# Patient Record
Sex: FEMALE | Race: WHITE | Employment: UNEMPLOYED | URBAN - METROPOLITAN AREA
[De-identification: names, ages, dates, MRNs, and addresses within clinical notes are randomized per-mention and may not be internally consistent; named-entity substitution may affect disease eponyms.]

---

## 2017-02-01 PROBLEM — O09.299 HISTORY OF MACROSOMIA IN INFANT IN PRIOR PREGNANCY, CURRENTLY PREGNANT: Status: ACTIVE | Noted: 2017-02-01

## 2017-02-03 PROBLEM — O09.299 HISTORY OF MACROSOMIA IN INFANT IN PRIOR PREGNANCY, CURRENTLY PREGNANT: Status: RESOLVED | Noted: 2017-02-01 | Resolved: 2017-02-03

## 2017-02-03 PROBLEM — D64.9 ANEMIA: Status: ACTIVE | Noted: 2017-02-03

## 2019-04-22 ENCOUNTER — OFFICE VISIT (OUTPATIENT)
Dept: INTERNAL MEDICINE CLINIC | Age: 39
End: 2019-04-22

## 2019-04-22 VITALS
BODY MASS INDEX: 27.46 KG/M2 | SYSTOLIC BLOOD PRESSURE: 119 MMHG | RESPIRATION RATE: 17 BRPM | HEIGHT: 63 IN | OXYGEN SATURATION: 98 % | HEART RATE: 83 BPM | WEIGHT: 155 LBS | DIASTOLIC BLOOD PRESSURE: 76 MMHG | TEMPERATURE: 98.1 F

## 2019-04-22 DIAGNOSIS — F32.A DEPRESSION, UNSPECIFIED DEPRESSION TYPE: ICD-10-CM

## 2019-04-22 DIAGNOSIS — Z00.00 ROUTINE GENERAL MEDICAL EXAMINATION AT A HEALTH CARE FACILITY: Primary | ICD-10-CM

## 2019-04-22 DIAGNOSIS — Z13.1 DIABETES MELLITUS SCREENING: ICD-10-CM

## 2019-04-22 PROBLEM — Z97.5 IUD (INTRAUTERINE DEVICE) IN PLACE: Status: ACTIVE | Noted: 2019-03-01

## 2019-04-22 RX ORDER — BUPROPION HYDROCHLORIDE 150 MG/1
TABLET ORAL
COMMUNITY
End: 2021-03-16 | Stop reason: ALTCHOICE

## 2019-04-22 RX ORDER — TRETINOIN 0.25 MG/G
CREAM TOPICAL
COMMUNITY
End: 2021-03-16 | Stop reason: ALTCHOICE

## 2019-04-22 NOTE — PROGRESS NOTES
Chief Complaint Patient presents with [facility] Establish Care  Depression HPI:  
 
Chris Schmitt is a 41 y.o.  female with history of depression  who presents for complete physical exam. She said that the wellbutrin is causing her blurry vision with increase in dosage and also increase watery eyes. She said lexapro made her have weight gain and had flat affect. She denies any suicidal or homicidal ideations. She is taking wellbutrin 450mg one po daily. She denies any chest pain, shortness of breath, abdominal pain, headaches or dizziness. She wants to continue to take the wellbutrin for now. Past Medical History:  
Diagnosis Date  Anemia 2/3/2017  Depression   
 adjustment disorder  IUD (intrauterine device) in place 03/01/2019  Thrombosis 2000   
 in R calf from sports injury Past Surgical History:  
Procedure Laterality Date [address] I&D of hematoma from sports injury MEDICATION ALLERGIES/INTOLERANCES:  
No Known Allergies CURRENT MEDICATIONS: 
 
Current Outpatient Medications Medication Sig  
 buPROPion XL (WELLBUTRIN XL) 150 mg tablet bupropion HCl  mg 24 hr tablet, extended release 
 take 1 tablet by mouth once daily TAKE TOTAL  MGS  tretinoin (RETIN-A) 0.025 % topical cream tretinoin 0.025 % topical cream  
 
No current facility-administered medications for this visit. Pt taking retin-A to \"smooth out her face. \" Health Maintenance Topic Date Due  Influenza Age 5 to Adult  08/01/2019  PAP AKA CERVICAL CYTOLOGY  02/21/2022  DTaP/Tdap/Td series (2 - Tdap) 12/15/2026  Pneumococcal 0-64 years  Aged Out FAMILY HISTORY:  
Family History Problem Relation Age of Onset  No Known Problems Mother  Other Father   
     basal cell carcinoma  No Known Problems Sister  No Known Problems Brother  No Known Problems Sister  No Known Problems Sister  No Known Problems Sister SOCIAL HISTORY:  
She  reports that she has never smoked. She has never used smokeless tobacco.  She  reports that she drinks alcohol. Pap smear: 2019 ROS:  
General: negative for - chills, fatigue, fever, weight loss or weight gain, night sweats HEENT: negative for - no sore throat, nasal congestion, vision problems or ear problems Resp: negative for - cough, shortness of breath or wheezing CV: negative for - chest pain, palpitations, orthopnea or PND, 
GI: negative for - abdominal pain, change in bowel habits, constipation, diarrhea, blood or black tarry stools, or nausea/vomiting : negative for - dysuria, hematuria, incontinence, pelvic pain or vulvar/vaginal symptoms Heme: negative for -excessive bleeding or bruising Endo: negative for - hot flashes, polydipsia/polyuria or hot or cold intolerance MSK: negative for - joint pain, joint swelling or muscle pain Neuro: negative for - numbness, tingling, headache or dizziness Derm: negative for - dry skin, hair changes, rash or skin lesion changes Psych: negative for - anxiety,irritability or mood swings or insomnia, positive for depression. Stable on wellbutrin. OBJECTIVE: 
PHYSICAL EXAM: Vitals:  
Vitals:  
 04/22/19 1035 BP: 119/76 Pulse: 83 Resp: 17 Temp: 98.1 °F (36.7 °C) TempSrc: Oral  
SpO2: 98% Weight: 155 lb (70.3 kg) Height: 5' 3\" (1.6 m) Generally: Pleasant female in no acute distress HEENT exam: Head: atraumatic Eyes: Pupils equally round and reactive to light, Fundoscopic exam is                       normal 
             Ears: bilaterally: Normal tympanic membrane, no erythema or exudate,                         normal light Reflex Nares: moist mucosa, no erythema Mouth: clear, no erythema or exudate Neck: supple, no lymphadenopathy, negative thyromegaly, negative                                   carotid bruits bilaterally Cardiac exam: regular, rate, and rhythm. No murmurs, gallops, or rubs. Normal S1 and S2. 
 
Pulmonary exam: Clear to ausculation bilaterally Abdominal exam: Positive bowel sounds in all four quadrants, soft, nondistended, nontender. No hepatosplenomegaly. Extremities: 2+ dorsalis pedis bilaterally. No pedal edema bilaterally. Musculoskeletal exam: 5 out of 5 strength in upper and lower extremities bilaterally. Good range of motion in upper and lower extremities. 2 out of 2 reflexes in upper and lower extremities bilaterally. Neurological exam: Cranial nerves II-XII all intact. Normal sensation in upper and lower extremities. Normal gait. LABS/RADIOLOGICAL TESTS:  
Lab Results Component Value Date/Time WBC 15.8 (H) 05/28/2014 07:05 PM  
 HGB 9.2 (L) 02/02/2017 07:10 AM  
 HCT 28.8 (L) 02/02/2017 07:10 AM  
 PLATELET 174 67/03/5755 07:05 PM  
 
No results found for: NA, K, CL, CO2, GLU, BUN, CREA No results found for: CHOL, CHOLX, CHLST, CHOLV, HDL, LDL, LDLC, DLDLP, TGLX, TRIGL, TRIGP No results found for: GPT 
 
previous labs ASSESSMENT/PLAN: 
 
  ICD-10-CM ICD-9-CM 1. Routine general medical examination at a health care facility Z00.00 V70.0 TSH 3RD GENERATION  
   CBC W/O DIFF  
   METABOLIC PANEL, COMPREHENSIVE  
   LIPID PANEL  
   HEMOGLOBIN A1C WITH EAG  
   VITAMIN D, 25 HYDROXY  
   URINALYSIS W/ RFLX MICROSCOPIC 2. Diabetes mellitus screening Z13.1 V77.1 HEMOGLOBIN A1C WITH EAG 3. Depression, unspecified depression type F32.9 311 Stable. Continue the wellbutrin. 4. Patient verbalized understanding and agreement with the plan. 5. Patient was given after visit summary. 6.  
Follow-up and Dispositions · Return in about 6 months (around 10/22/2019) for f/u depression or sooner if worsening symptoms.  
  
  
 
 
 
Doe Kwan MD

## 2019-04-22 NOTE — PROGRESS NOTES
ROOM # 1 Identified pt with two pt identifiers(name and ). Reviewed record in preparation for visit and have obtained necessary documentation. Chief Complaint Patient presents with [address] [place] [address]  Depression Klaus Cornejo preferred language for health care discussion is english/other. Is the patient using any DME equipment during OV? NO Klaus Cornejo is due for: 
Health Maintenance Due Topic  PAP AKA CERVICAL CYTOLOGY Health Maintenance reviewed and discussed per provider Please order/place referral if appropriate. Advance Directive: 1. Do you have an advance directive in place? Patient Reply: NO 
 
2. If not, would you like material regarding how to put one in place? NO Coordination of Care: 1. Have you been to the ER, urgent care clinic since your last visit? Hospitalized since your last visit? NO 
 
2. Have you seen or consulted any other health care providers outside of the [address] [place] [address] since your last visit? Include any pap smears or colon screening. NO Patient is accompanied by self I have received verbal consent from Klaus Cornejo to discuss any/all medical information while they are present in the room. Learning Assessment: 
Learning Assessment 2019 PRIMARY LEARNER Patient HIGHEST LEVEL OF EDUCATION - PRIMARY LEARNER  4 YEARS OF COLLEGE  
CO-LEARNER CAREGIVER No  
PRIMARY LANGUAGE ENGLISH  
LEARNER PREFERENCE PRIMARY READING  
ANSWERED BY pt  
RELATIONSHIP SELF Depression Screening: 
3 most recent PHQ Screens 2019 Little interest or pleasure in doing things Not at all Feeling down, depressed, irritable, or hopeless Not at all Total Score PHQ 2 0 Abuse Screening: 
n/i Fall Risk 
n/i

## 2019-04-24 ENCOUNTER — HOSPITAL ENCOUNTER (OUTPATIENT)
Dept: LAB | Age: 39
Discharge: HOME | End: 2019-04-24
Payer: OTHER GOVERNMENT

## 2019-04-24 DIAGNOSIS — Z00.00 ROUTINE GENERAL MEDICAL EXAMINATION AT A HEALTH CARE FACILITY: ICD-10-CM

## 2019-04-24 DIAGNOSIS — Z13.1 DIABETES MELLITUS SCREENING: ICD-10-CM

## 2019-04-24 LAB
ALBUMIN SERPL-MCNC: 4 G/DL (ref 3.4–5)
ALBUMIN/GLOB SERPL: 1.2 {RATIO} (ref 0.8–1.7)
ALP SERPL-CCNC: 82 U/L (ref 45–117)
ALT SERPL-CCNC: 25 U/L (ref 13–56)
ANION GAP SERPL CALC-SCNC: 8 MMOL/L (ref 3–18)
APPEARANCE UR: CLEAR
AST SERPL-CCNC: 12 U/L (ref 15–37)
BILIRUB SERPL-MCNC: 0.3 MG/DL (ref 0.2–1)
BILIRUB UR QL: NEGATIVE
BUN SERPL-MCNC: 18 MG/DL (ref 7–18)
BUN/CREAT SERPL: 18 (ref 12–20)
CALCIUM SERPL-MCNC: 8.7 MG/DL (ref 8.5–10.1)
CHLORIDE SERPL-SCNC: 108 MMOL/L (ref 100–108)
CHOLEST SERPL-MCNC: 205 MG/DL
CO2 SERPL-SCNC: 25 MMOL/L (ref 21–32)
COLOR UR: YELLOW
CREAT SERPL-MCNC: 0.98 MG/DL (ref 0.6–1.3)
ERYTHROCYTE [DISTWIDTH] IN BLOOD BY AUTOMATED COUNT: 13.5 % (ref 11.6–14.5)
EST. AVERAGE GLUCOSE BLD GHB EST-MCNC: 103 MG/DL
GLOBULIN SER CALC-MCNC: 3.4 G/DL (ref 2–4)
GLUCOSE SERPL-MCNC: 72 MG/DL (ref 74–99)
GLUCOSE UR STRIP.AUTO-MCNC: NEGATIVE MG/DL
HBA1C MFR BLD: 5.2 % (ref 4.2–5.6)
HCT VFR BLD AUTO: 40.1 % (ref 35–45)
HDLC SERPL-MCNC: 62 MG/DL (ref 40–60)
HDLC SERPL: 3.3 {RATIO} (ref 0–5)
HGB BLD-MCNC: 12.5 G/DL (ref 12–16)
HGB UR QL STRIP: NEGATIVE
KETONES UR QL STRIP.AUTO: NEGATIVE MG/DL
LDLC SERPL CALC-MCNC: 127.4 MG/DL (ref 0–100)
LEUKOCYTE ESTERASE UR QL STRIP.AUTO: NEGATIVE
LIPID PROFILE,FLP: ABNORMAL
MCH RBC QN AUTO: 29.4 PG (ref 24–34)
MCHC RBC AUTO-ENTMCNC: 31.2 G/DL (ref 31–37)
MCV RBC AUTO: 94.4 FL (ref 74–97)
NITRITE UR QL STRIP.AUTO: NEGATIVE
PH UR STRIP: 5.5 [PH] (ref 5–8)
PLATELET # BLD AUTO: 308 K/UL (ref 135–420)
PMV BLD AUTO: 9.7 FL (ref 9.2–11.8)
POTASSIUM SERPL-SCNC: 4.4 MMOL/L (ref 3.5–5.5)
PROT SERPL-MCNC: 7.4 G/DL (ref 6.4–8.2)
PROT UR STRIP-MCNC: NEGATIVE MG/DL
RBC # BLD AUTO: 4.25 M/UL (ref 4.2–5.3)
SODIUM SERPL-SCNC: 141 MMOL/L (ref 136–145)
SP GR UR REFRACTOMETRY: 1.02 (ref 1–1.03)
TRIGL SERPL-MCNC: 78 MG/DL (ref ?–150)
TSH SERPL DL<=0.05 MIU/L-ACNC: 2.4 UIU/ML (ref 0.36–3.74)
UROBILINOGEN UR QL STRIP.AUTO: 0.2 EU/DL (ref 0.2–1)
VLDLC SERPL CALC-MCNC: 15.6 MG/DL
WBC # BLD AUTO: 8.5 K/UL (ref 4.6–13.2)

## 2019-04-24 PROCEDURE — 81003 URINALYSIS AUTO W/O SCOPE: CPT

## 2019-04-24 PROCEDURE — 82306 VITAMIN D 25 HYDROXY: CPT

## 2019-04-24 PROCEDURE — 84443 ASSAY THYROID STIM HORMONE: CPT

## 2019-04-24 PROCEDURE — 80061 LIPID PANEL: CPT

## 2019-04-24 PROCEDURE — 80053 COMPREHEN METABOLIC PANEL: CPT

## 2019-04-24 PROCEDURE — 85027 COMPLETE CBC AUTOMATED: CPT

## 2019-04-24 PROCEDURE — 83036 HEMOGLOBIN GLYCOSYLATED A1C: CPT

## 2019-04-24 PROCEDURE — 36415 COLL VENOUS BLD VENIPUNCTURE: CPT

## 2019-04-25 PROBLEM — E55.9 VITAMIN D DEFICIENCY: Status: ACTIVE | Noted: 2019-04-01

## 2019-04-25 LAB — 25(OH)D3 SERPL-MCNC: 21.9 NG/ML (ref 30–100)

## 2021-03-16 ENCOUNTER — HOSPITAL ENCOUNTER (OUTPATIENT)
Dept: LAB | Age: 41
Discharge: HOME | End: 2021-03-16
Payer: OTHER GOVERNMENT

## 2021-03-16 ENCOUNTER — OFFICE VISIT (OUTPATIENT)
Dept: INTERNAL MEDICINE CLINIC | Age: 41
End: 2021-03-16
Payer: OTHER GOVERNMENT

## 2021-03-16 VITALS
HEIGHT: 63 IN | WEIGHT: 164 LBS | RESPIRATION RATE: 18 BRPM | BODY MASS INDEX: 29.06 KG/M2 | DIASTOLIC BLOOD PRESSURE: 72 MMHG | OXYGEN SATURATION: 99 % | HEART RATE: 72 BPM | SYSTOLIC BLOOD PRESSURE: 118 MMHG | TEMPERATURE: 97.1 F

## 2021-03-16 DIAGNOSIS — Z13.83 SCREENING FOR CARDIOVASCULAR, RESPIRATORY, AND GENITOURINARY DISEASES: ICD-10-CM

## 2021-03-16 DIAGNOSIS — Z13.1 SCREENING FOR DIABETES MELLITUS (DM): ICD-10-CM

## 2021-03-16 DIAGNOSIS — Z13.220 SCREENING FOR LIPOID DISORDERS: ICD-10-CM

## 2021-03-16 DIAGNOSIS — Z13.0 SCREENING FOR DEFICIENCY ANEMIA: ICD-10-CM

## 2021-03-16 DIAGNOSIS — Z13.29 SCREENING FOR THYROID DISORDER: ICD-10-CM

## 2021-03-16 DIAGNOSIS — Z00.00 ROUTINE MEDICAL EXAM: ICD-10-CM

## 2021-03-16 DIAGNOSIS — Z13.89 SCREENING FOR CARDIOVASCULAR, RESPIRATORY, AND GENITOURINARY DISEASES: ICD-10-CM

## 2021-03-16 DIAGNOSIS — Z13.6 SCREENING FOR CARDIOVASCULAR, RESPIRATORY, AND GENITOURINARY DISEASES: ICD-10-CM

## 2021-03-16 DIAGNOSIS — Z00.00 ROUTINE MEDICAL EXAM: Primary | ICD-10-CM

## 2021-03-16 LAB
ALBUMIN SERPL-MCNC: 4 G/DL (ref 3.4–5)
ALBUMIN/GLOB SERPL: 1.3 {RATIO} (ref 0.8–1.7)
ALP SERPL-CCNC: 51 U/L (ref 45–117)
ALT SERPL-CCNC: 29 U/L (ref 13–56)
ANION GAP SERPL CALC-SCNC: 4 MMOL/L (ref 3–18)
APPEARANCE UR: CLEAR
AST SERPL-CCNC: 12 U/L (ref 10–38)
BILIRUB SERPL-MCNC: 0.6 MG/DL (ref 0.2–1)
BILIRUB UR QL: NEGATIVE
BUN SERPL-MCNC: 15 MG/DL (ref 7–18)
BUN/CREAT SERPL: 25 (ref 12–20)
CALCIUM SERPL-MCNC: 8.6 MG/DL (ref 8.5–10.1)
CHLORIDE SERPL-SCNC: 105 MMOL/L (ref 100–111)
CHOLEST SERPL-MCNC: 192 MG/DL
CO2 SERPL-SCNC: 27 MMOL/L (ref 21–32)
COLOR UR: YELLOW
CREAT SERPL-MCNC: 0.6 MG/DL (ref 0.6–1.3)
ERYTHROCYTE [DISTWIDTH] IN BLOOD BY AUTOMATED COUNT: 13.1 % (ref 11.6–14.5)
EST. AVERAGE GLUCOSE BLD GHB EST-MCNC: 97 MG/DL
GLOBULIN SER CALC-MCNC: 3.1 G/DL (ref 2–4)
GLUCOSE SERPL-MCNC: 66 MG/DL (ref 74–99)
GLUCOSE UR STRIP.AUTO-MCNC: NEGATIVE MG/DL
HBA1C MFR BLD: 5 % (ref 4.2–5.6)
HCT VFR BLD AUTO: 39.9 % (ref 35–45)
HDLC SERPL-MCNC: 58 MG/DL (ref 40–60)
HDLC SERPL: 3.3 {RATIO} (ref 0–5)
HGB BLD-MCNC: 13.2 G/DL (ref 12–16)
HGB UR QL STRIP: NEGATIVE
KETONES UR QL STRIP.AUTO: NEGATIVE MG/DL
LDLC SERPL CALC-MCNC: 117.6 MG/DL (ref 0–100)
LEUKOCYTE ESTERASE UR QL STRIP.AUTO: NEGATIVE
LIPID PROFILE,FLP: ABNORMAL
MCH RBC QN AUTO: 30.7 PG (ref 24–34)
MCHC RBC AUTO-ENTMCNC: 33.1 G/DL (ref 31–37)
MCV RBC AUTO: 92.8 FL (ref 74–97)
NITRITE UR QL STRIP.AUTO: NEGATIVE
PH UR STRIP: 6 [PH] (ref 5–8)
PLATELET # BLD AUTO: 326 K/UL (ref 135–420)
PMV BLD AUTO: 9.5 FL (ref 9.2–11.8)
POTASSIUM SERPL-SCNC: 3.9 MMOL/L (ref 3.5–5.5)
PROT SERPL-MCNC: 7.1 G/DL (ref 6.4–8.2)
PROT UR STRIP-MCNC: NEGATIVE MG/DL
RBC # BLD AUTO: 4.3 M/UL (ref 4.2–5.3)
SODIUM SERPL-SCNC: 136 MMOL/L (ref 136–145)
SP GR UR REFRACTOMETRY: 1.02 (ref 1–1.03)
T4 FREE SERPL-MCNC: 1 NG/DL (ref 0.7–1.5)
TRIGL SERPL-MCNC: 82 MG/DL (ref ?–150)
TSH SERPL DL<=0.05 MIU/L-ACNC: 1.17 UIU/ML (ref 0.36–3.74)
UROBILINOGEN UR QL STRIP.AUTO: 0.2 EU/DL (ref 0.2–1)
VLDLC SERPL CALC-MCNC: 16.4 MG/DL
WBC # BLD AUTO: 10 K/UL (ref 4.6–13.2)

## 2021-03-16 PROCEDURE — 85027 COMPLETE CBC AUTOMATED: CPT

## 2021-03-16 PROCEDURE — 99395 PREV VISIT EST AGE 18-39: CPT | Performed by: NURSE PRACTITIONER

## 2021-03-16 PROCEDURE — 81003 URINALYSIS AUTO W/O SCOPE: CPT

## 2021-03-16 PROCEDURE — 84439 ASSAY OF FREE THYROXINE: CPT

## 2021-03-16 PROCEDURE — 80061 LIPID PANEL: CPT

## 2021-03-16 PROCEDURE — 36415 COLL VENOUS BLD VENIPUNCTURE: CPT

## 2021-03-16 PROCEDURE — 83036 HEMOGLOBIN GLYCOSYLATED A1C: CPT

## 2021-03-16 PROCEDURE — 80053 COMPREHEN METABOLIC PANEL: CPT

## 2021-03-16 NOTE — PROGRESS NOTES
[address] [facility]  Primary Care Office Visit - Complete Physical    Ree Zhang is a 47 y.o. female presenting for annual physical.  : 1980        Assessment/Plan:     1. Routine medical exam  Update labs  - METABOLIC PANEL, COMPREHENSIVE; Future  - CBC W/O DIFF; Future  - HEMOGLOBIN A1C WITH EAG; Future  - LIPID PANEL; Future  - TSH AND FREE T4; Future  - URINALYSIS W/MICROSCOPIC; Future    2. Screening for cardiovascular, respiratory, and genitourinary diseases    - METABOLIC PANEL, COMPREHENSIVE; Future  - URINALYSIS W/MICROSCOPIC; Future    3. Screening for deficiency anemia    - CBC W/O DIFF; Future    4. Screening for diabetes mellitus (DM)    - HEMOGLOBIN A1C WITH EAG; Future    5. Screening for lipoid disorders    - LIPID PANEL; Future    6. Screening for thyroid disorder    - TSH AND FREE T4; Future      Follow-up and Dispositions    · Return in about 1 year (around 3/16/2022) for CPE. Orders & Diagnoses associated with This Encounter:         ICD-10-CM ICD-9-CM   1. Routine medical exam  Z00.00 V70.0   2. Screening for cardiovascular, respiratory, and genitourinary diseases  Z13.6 V81.2    Z13.89 V81.6    Z13.83 V81.4   3. Screening for deficiency anemia  Z13.0 V78.1   4. Screening for diabetes mellitus (DM)  Z13.1 V77.1   5. Screening for lipoid disorders  Z13.220 V77.91   6.  Screening for thyroid disorder  Z13.29 V77.0       Orders Placed This Encounter    METABOLIC PANEL, COMPREHENSIVE     Standing Status:   Future     Standing Expiration Date:   3/17/2022    CBC W/O DIFF     Standing Status:   Future     Standing Expiration Date:   3/16/2022    HEMOGLOBIN A1C WITH EAG     Standing Status:   Future     Standing Expiration Date:   3/17/2022    LIPID PANEL     Standing Status:   Future     Standing Expiration Date:   3/17/2022    TSH AND FREE T4     Standing Status:   Future     Standing Expiration Date:   3/17/2022    URINALYSIS W/MICROSCOPIC     Standing Status: Future     Standing Expiration Date:   3/17/2022         Management plan & patient instructions reviewed with Mary Magana, who voiced understanding. This document may have been created with the aid of dictation software. Text may contain errors, particularly phonetic errors. Karol Montelongo   Internal Medicine  3/16/2021         Subjective   History:   Mary Magana is a 47 y.o. female presenting for an annual physical.    She has an upcoming move. Denies any symptoms today. Past Medical History:   Diagnosis Date    Anemia 2/3/2017    Depression     adjustment disorder    Hematoma 2000    in R calf from sports injury    IUD (intrauterine device) in place 03/01/2019    removed 06/2020    Vitamin D deficiency 04/2019     Past Surgical History:   Procedure Laterality Date    HX OTHER SURGICAL  2000    I&D of hematoma from sports injury      reports that she has never smoked. She has never used smokeless tobacco. She reports previous alcohol use. She reports that she does not use drugs. Social History     Social History Narrative    Not on file     Social History     Tobacco Use   Smoking Status Never Smoker   Smokeless Tobacco Never Used   Tobacco Comment    Pt counseled to stop smoking. Family History   Problem Relation Age of Onset    No Known Problems Mother     Other Father         basal cell carcinoma    No Known Problems Sister     No Known Problems Brother     No Known Problems Sister     No Known Problems Sister     No Known Problems Sister      No Known Allergies    Problem List:      Patient Active Problem List    Diagnosis    Vitamin D deficiency    IUD (intrauterine device) in place    Anemia       Medications:     No current outpatient medications on file. No current facility-administered medications for this visit. Review of Systems:     Review of Systems   Constitutional: Negative for chills, fever and malaise/fatigue.    Eyes: Negative for blurred vision and double vision. Respiratory: Negative for cough, shortness of breath and wheezing. Cardiovascular: Negative for chest pain, palpitations and leg swelling. Gastrointestinal: Negative for heartburn, nausea and vomiting. Genitourinary: Negative for frequency and urgency. Neurological: Negative for dizziness and headaches. Endo/Heme/Allergies: Negative for polydipsia. Psychiatric/Behavioral: Negative for depression. The patient is not nervous/anxious. Objective   Physical Assessment:   VS:    Visit Vitals  /72 (BP 1 Location: Right arm, BP Patient Position: Sitting)   Pulse 72   Temp 97.1 °F (36.2 °C) (Temporal)   Resp 18   Ht 5' 3\" (1.6 m)   Wt 164 lb (74.4 kg)   LMP  (Approximate)   SpO2 99%   BMI 29.05 kg/m²         Physical Exam  Vitals signs and nursing note reviewed. Constitutional:       General: She is not in acute distress. Appearance: Normal appearance. She is not ill-appearing, toxic-appearing or diaphoretic. HENT:      Head: Normocephalic and atraumatic. Right Ear: Tympanic membrane, ear canal and external ear normal.      Left Ear: Tympanic membrane, ear canal and external ear normal.      Nose: Nose normal. No congestion. Mouth/Throat:      Mouth: Mucous membranes are dry. Pharynx: Oropharynx is clear. Comments: MASK    Eyes:      General: No scleral icterus. Right eye: No discharge. Left eye: No discharge. Extraocular Movements: Extraocular movements intact. Conjunctiva/sclera: Conjunctivae normal.      Pupils: Pupils are equal, round, and reactive to light. Neck:      Musculoskeletal: Normal range of motion. Thyroid: No thyromegaly. Cardiovascular:      Rate and Rhythm: Normal rate and regular rhythm. Pulses: Normal pulses. Heart sounds: Normal heart sounds. Pulmonary:      Effort: Pulmonary effort is normal. No respiratory distress. Breath sounds: Normal breath sounds. No wheezing. Abdominal:      General: Abdomen is flat. Tenderness: There is no right CVA tenderness or left CVA tenderness. Musculoskeletal: Normal range of motion. General: No swelling or tenderness. Right lower leg: No edema. Left lower leg: No edema. Lymphadenopathy:      Cervical: No cervical adenopathy. Skin:     General: Skin is warm and dry. Findings: No lesion or rash. Neurological:      General: No focal deficit present. Mental Status: She is alert and oriented to person, place, and time. Mental status is at baseline. Psychiatric:         Mood and Affect: Mood normal.         Behavior: Behavior normal.         Thought Content: Thought content normal.         Judgment: Judgment normal.         Records Review:           Recent Labs & Imaging:     No results found for this or any previous visit (from the past 12 hour(s)).

## 2021-03-16 NOTE — PROGRESS NOTES
ROOM # 1  Identified pt with two pt identifiers(name and ). Reviewed record in preparation for visit and have obtained necessary documentation. Chief Complaint   Patient presents with    Physical      Evan Jermaine preferred language for health care discussion is english/other. Is the patient using any DME equipment during OV? Negrito Benítez is due for:  Health Maintenance Due   Topic    Hepatitis C Screening     Flu Vaccine (1)     Health Maintenance reviewed and discussed per provider  Please order/place referral if appropriate. Advance Directive:  1. Do you have an advance directive in place? Patient Reply: NO    2. If not, would you like material regarding how to put one in place? NO    Coordination of Care:  1. Have you been to the ER, urgent care clinic since your last visit? Hospitalized since your last visit? NO    2. Have you seen or consulted any other health care providers outside of the [address] [place] [address] since your last visit? Include any pap smears or colon screening. NO    Patient is accompanied by self I have received verbal consent from Evan Dean to discuss any/all medical information while they are present in the room.     Learning Assessment:  Learning Assessment 2019   PRIMARY LEARNER Patient   HIGHEST LEVEL OF EDUCATION - PRIMARY LEARNER  4 YEARS OF COLLEGE   CO-LEARNER CAREGIVER No   PRIMARY LANGUAGE ENGLISH   LEARNER PREFERENCE PRIMARY READING   ANSWERED BY pt   RELATIONSHIP SELF     Depression Screening:  3 most recent PHQ Screens 3/16/2021 2019   Little interest or pleasure in doing things Not at all Not at all   Feeling down, depressed, irritable, or hopeless Not at all Not at all   Total Score PHQ 2 0 0     Abuse Screening:  n/a  Fall Risk  n/a  Recent Travel Screening and Travel History documentation     Travel Screening     Question   Response    In the last month, have you been in contact with someone who was confirmed or suspected to have Satinder Antonio / [id number]? No / Unsure    Have you had a COVID-19 viral test in the last 14 days? No    Do you have any of the following new or worsening symptoms? None of these    Have you traveled internationally or domestically in the last month?   No      Travel History   Travel since 02/16/21     No documented travel since 02/16/21

## 2021-03-31 ENCOUNTER — HOSPITAL ENCOUNTER (OUTPATIENT)
Dept: LAB | Age: 41
Discharge: HOME | End: 2021-03-31
Payer: OTHER GOVERNMENT

## 2021-03-31 PROCEDURE — 87624 HPV HI-RISK TYP POOLED RSLT: CPT

## 2021-04-06 LAB
CYTOLOGIST CVX/VAG CYTO: NORMAL
CYTOLOGY CVX/VAG DOC THIN PREP: NORMAL
HPV APTIMA: NEGATIVE
Lab: NORMAL
PATH REPORT.FINAL DX SPEC: NORMAL
STAT OF ADQ CVX/VAG CYTO-IMP: NORMAL

## 2021-05-13 ENCOUNTER — OFFICE VISIT (OUTPATIENT)
Dept: INTERNAL MEDICINE CLINIC | Age: 41
End: 2021-05-13
Payer: OTHER GOVERNMENT

## 2021-05-13 VITALS
HEART RATE: 71 BPM | OXYGEN SATURATION: 98 % | RESPIRATION RATE: 20 BRPM | SYSTOLIC BLOOD PRESSURE: 107 MMHG | TEMPERATURE: 97.2 F | WEIGHT: 167.2 LBS | DIASTOLIC BLOOD PRESSURE: 69 MMHG | BODY MASS INDEX: 29.62 KG/M2 | HEIGHT: 63 IN

## 2021-05-13 DIAGNOSIS — Z86.59 HISTORY OF DEPRESSION: Primary | ICD-10-CM

## 2021-05-13 DIAGNOSIS — Z34.90 PREGNANCY, UNSPECIFIED GESTATIONAL AGE: ICD-10-CM

## 2021-05-13 PROCEDURE — 99212 OFFICE O/P EST SF 10 MIN: CPT | Performed by: INTERNAL MEDICINE

## 2021-05-13 NOTE — PROGRESS NOTES
Progress Note    Patient: Marguerite Henson               Sex: female                  YOB: 1980      Age:  47 y.o.                    HPI:     Marguerite Henson is a 47 y.o. female who has been seen for \"  Needs a note for the [employer] re  Depression\" . She has been off meds x 2 yrs . She had seen Psych  2 yrs ago . She  Has been off wellbutrin x 2yrs     Past Medical History:   Diagnosis Date    Anemia 2/3/2017    Depression     adjustment disorder    Elevated LDL cholesterol level 03/17/2021    Hematoma 2000    in R calf from sports injury    IUD (intrauterine device) in place 03/01/2019    removed 06/2020    Vitamin D deficiency 04/2019       Past Surgical History:   Procedure Laterality Date    HX OTHER SURGICAL  2000    I&D of hematoma from sports injury       Family History   Problem Relation Age of Onset    No Known Problems Mother     Other Father         basal cell carcinoma    No Known Problems Sister     No Known Problems Brother     No Known Problems Sister     No Known Problems Sister     No Known Problems Sister        Social History     Socioeconomic History    Marital status:      Spouse name: Not on file    Number of children: Not on file    Years of education: Not on file    Highest education level: Not on file   Tobacco Use    Smoking status: Never Smoker    Smokeless tobacco: Never Used    Tobacco comment: Pt counseled to stop smoking. Substance and Sexual Activity    Alcohol use: Not Currently     Comment: rarely    Drug use: No    Sexual activity: Yes     Partners: Male         Current Outpatient Medications:     PNV Comb #2-Iron-FA-Omega 3 29-1-400 mg cmpk, Take  by mouth., Disp: , Rfl:      No Known Allergies    Review of Systems   Constitutional: Negative for chills, fever and weight loss. HENT: Negative for hearing loss. Eyes: Negative for blurred vision. Respiratory: Negative for cough and shortness of breath.     Cardiovascular: Negative for chest pain. Gastrointestinal: Negative. Genitourinary: Negative. Neurological: Negative for dizziness and loss of consciousness. Psychiatric/Behavioral: Negative for depression. The patient is not nervous/anxious and does not have insomnia. Physical Exam:      Visit Vitals  /69 (BP 1 Location: Right upper arm, BP Patient Position: Sitting, BP Cuff Size: Adult)   Pulse 71   Temp 97.2 °F (36.2 °C) (Temporal)   Resp 20   Ht 5' 3\" (1.6 m)   Wt 167 lb 3.2 oz (75.8 kg)   SpO2 98%   BMI 29.62 kg/m²       Physical Exam  Constitutional:       General: She is not in acute distress. Appearance: Normal appearance. She is not ill-appearing. HENT:      Head: Normocephalic. Neck:      Musculoskeletal: Normal range of motion and neck supple. Cardiovascular:      Pulses: Normal pulses. Heart sounds: Normal heart sounds. Pulmonary:      Effort: Pulmonary effort is normal.      Breath sounds: Normal breath sounds. Skin:     General: Skin is warm and dry. Neurological:      General: No focal deficit present. Mental Status: She is alert and oriented to person, place, and time. Psychiatric:         Mood and Affect: Mood normal.         Behavior: Behavior normal.         Thought Content: Thought content normal.         Judgment: Judgment normal.          Labs Reviewed:      Assessment/Plan       ICD-10-CM ICD-9-CM    1. History of depression  Z86.59 V11.8    2.  Pregnancy, unspecified gestational age  Z27.80 V22.2    note for  [facility] depression   completed       Juarez Terrazas MD

## 2021-05-13 NOTE — PROGRESS NOTES
Trini Lugo is a 47 y.o. female (: 1980) presenting to address:    Chief Complaint   Patient presents with    Letter for School/Work       Vitals:    21 1432   BP: 107/69   Pulse: 71   Resp: 20   Temp: 97.2 °F (36.2 °C)   TempSrc: Temporal   SpO2: 98%   Weight: 167 lb 3.2 oz (75.8 kg)   Height: 5' 3\" (1.6 m)   PainSc:   0 - No pain       Hearing/Vision:   No exam data present    Learning Assessment:     Learning Assessment 2019   PRIMARY LEARNER Patient   HIGHEST LEVEL OF EDUCATION - PRIMARY LEARNER  4 YEARS OF COLLEGE   CO-LEARNER CAREGIVER No   PRIMARY LANGUAGE ENGLISH   LEARNER PREFERENCE PRIMARY READING   ANSWERED BY pt   RELATIONSHIP SELF     Depression Screening:     3 most recent PHQ Screens 2021   Little interest or pleasure in doing things Not at all   Feeling down, depressed, irritable, or hopeless Not at all   Total Score PHQ 2 0     Fall Risk Assessment:   No flowsheet data found. Abuse Screening:   No flowsheet data found. Coordination of Care Questionaire:   1. Have you been to the ER, urgent care clinic since your last visit? Hospitalized since your last visit? NO    2. Have you seen or consulted any other health care providers outside of the [address] [place] [address] since your last visit? Include any pap smears or colon screening. NO    Advanced Directive:   1. Do you have an Advanced Directive? NO    2. Would you like information on Advanced Directives?  NO

## 2021-05-13 NOTE — LETTER
5/13/2021 3:01 PM 
 
Ms. Pj Patrick [facility] [phone] To Whom it may concern : 
 
Patient Depression is completely resolved. Patient has been off of medication for two years. Sincerely, Avani Irby MD

## 2021-06-24 ENCOUNTER — TELEPHONE (OUTPATIENT)
Dept: INTERNAL MEDICINE CLINIC | Age: 41
End: 2021-06-24

## 2021-06-24 NOTE — TELEPHONE ENCOUNTER
Patient is coming for a CPE, and needs labwork done beforehand,  Please enter labwork, and patient says she always has a thyroid panel as well.